# Patient Record
Sex: FEMALE | Race: WHITE | URBAN - METROPOLITAN AREA
[De-identification: names, ages, dates, MRNs, and addresses within clinical notes are randomized per-mention and may not be internally consistent; named-entity substitution may affect disease eponyms.]

---

## 2024-10-07 ENCOUNTER — OFFICE VISIT (OUTPATIENT)
Facility: CLINIC | Age: 29
End: 2024-10-07

## 2024-10-07 VITALS — DIASTOLIC BLOOD PRESSURE: 68 MMHG | HEART RATE: 83 BPM | SYSTOLIC BLOOD PRESSURE: 103 MMHG | WEIGHT: 125 LBS

## 2024-10-07 DIAGNOSIS — N28.9 RENAL INSUFFICIENCY: ICD-10-CM

## 2024-10-07 DIAGNOSIS — N39.0 URINARY TRACT INFECTION WITHOUT HEMATURIA, SITE UNSPECIFIED: Primary | ICD-10-CM

## 2024-10-07 RX ORDER — AMOXICILLIN AND CLAVULANATE POTASSIUM 500; 125 MG/1; MG/1
1 TABLET, FILM COATED ORAL 2 TIMES DAILY
Qty: 30 TABLET | Refills: 2 | Status: SHIPPED | OUTPATIENT
Start: 2024-10-07

## 2024-10-07 RX ORDER — ETONOGESTREL AND ETHINYL ESTRADIOL VAGINAL .015; .12 MG/D; MG/D
1 RING VAGINAL
COMMUNITY
Start: 2024-09-24

## 2024-10-07 RX ORDER — TRETINOIN 1 MG/G
CREAM TOPICAL NIGHTLY
COMMUNITY
Start: 2024-09-19

## 2024-10-07 RX ORDER — CLINDAMYCIN PHOSPHATE 11.9 MG/ML
1 SOLUTION TOPICAL DAILY
COMMUNITY
Start: 2024-09-19

## 2024-10-07 NOTE — PATIENT INSTRUCTIONS
Please take Augmentin as needed x 1    Or if symptoms take it 2x day for 3 total    Please make sure to give urine specimen (standing order enclosed)    Healthy diet, avoid NSAID (advil, aleve, motrin, ibuprofen), avoid IV dye

## 2024-10-07 NOTE — PROGRESS NOTES
Reason for Consult: Abnormal kidney function    HPI:         Here to establish care.    In 2015/2016 had right flank pains recurrently.  Was told that they were stone related or UTI related.    In 2023 she was in [place] and she finally had imaging - found to have R sided hydronephrosis and cortical atrophy/R UPJ obstruction.  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    Saw multiple urologists including Dr Valencia and finally Dr. Zayas at [facility].  He did pyeloplaty in December to maintain GFR by robotic approach    Lasix renal scan done pre op shows L kidey 79% and R kidney 21%.  GFR in Dec 2023 was 78 (Creatinine 1) and repeated in 8/26/23 creatinine 1.1 and GFR 71    Patient also has frequent UTIs  HISTORY:  History reviewed. No pertinent past medical history.   History reviewed. No pertinent surgical history.   Family History   Problem Relation Age of Onset    Diabetes Father     Cancer Maternal Grandmother     Heart Disorder Maternal Grandfather     Cancer Paternal Grandmother     Cancer Paternal Grandfather       Social History:   Social History     Socioeconomic History    Marital status: Single   Tobacco Use    Smoking status: Never    Smokeless tobacco: Never   Substance and Sexual Activity    Alcohol use: Yes     Alcohol/week: 2.0 standard drinks of alcohol     Types: 2 Glasses of wine per week     Comment: occ    Drug use: Never     Social Determinants of Health     Food Insecurity: Low Risk  (3/14/2024)    Received [facility]    Food Insecurity     Have there been times that your food ran out, and you didn't have money to get more?: No     Are there times that you worry that this might happen?: No   Transportation Needs: Low Risk  (3/14/2024)    Received from [facility]    Transportation Needs     Do you have trouble getting transportation to medical appointments?: No     How do you normally get to and from your  appointments?: Public Transit (such as [facility], [facility], [employer])        Medications (Active prior to today's visit):  Current Outpatient Medications   Medication Sig Dispense Refill    clindamycin 1 % External Solution Apply 1 Application topically daily.      ENILLORING 0.12-0.015 MG/24HR Vaginal Ring Place 1 each vaginally every 21 days.      Tretinoin 0.1 % External Cream Apply topically nightly.      amoxicillin clavulanate (AUGMENTIN) 500-125 MG Oral Tab Take 1 tablet by mouth in the morning and 1 tablet before bedtime. Please take 1 tab BID x 3 days as needed for UTI prophylaxis. 30 tablet 2       Allergies:  Not on File      ROS:     Constitutional:  Negative for decreased activity, fever, irritability and lethargy  ENMT:  Negative for ear drainage, hearing loss and nasal drainage  Eyes:  Negative for eye discharge and vision loss  Cardiovascular:  Negative for chest pain, sobs  Respiratory:  Negative for cough, dyspnea and wheezing  Gastrointestinal:  Negative for abdominal pain, constipation  Genitourinary:  Negative for dysuria and hematuria  Endocrine:  Negative for abnormal sleep patterns, increased activity  Hema/Lymph:  Negative for easy bleeding and easy bruising  Integumentary:  Negative for pruritus and rash  Musculoskeletal:  Negative for bone/joint symptoms  Neurological:  Negative for gait disturbance  Psychiatric:  Negative for inappropriate interaction and psychiatric symptoms      Vitals:    10/07/24 1001   BP: 103/68   Pulse: 83       PHYSICAL EXAM:   Constitutional: appears well hydrated alert and responsive no acute distress noted  Head/Face: normocephalic  Eyes/Vision: normal extraocular motion is intact  Nose/Mouth/Throat:mucous membranes are moist   Neck/Thyroid: neck is supple without adenopathy  Lymphatic: no abnormal cervical, supraclavicular adenopathy is noted  Respiratory:  lungs are clear to auscultation bilaterally  Cardiovascular: regular rate and rhythm  Abdomen: soft, non-tender,  non-distended, BS normal  Skin/Hair: no unusual rashes present  Back/Spine: no abnormalities noted  Musculoskeletal:  no deformities  Extremities: no edema  Neurological:  Grossly normal     No results found for: \"GLU\", \"NA\", \"K\", \"CL\", \"CO2\", \"ANIONGAP\", \"BUN\", \"CREATSERUM\", \"CA\", \"OSMOCALC\", \"EGFRCR\", \"ALB\", \"PHOS\"      ASSESSMENT/PLAN:   Assessment   1. Urinary tract infection without hematuria, site unspecified  Some of her UTIs are happening postcoital.  I looked at the sensitivities of Osmel her urinary tract infections 1 from July and one from August.  They are both E. coli.  However the later E. coli infection had some resistance patterns.  It is sensitive to Augmentin.  I have given her prescription for Augmentin to take postcoital and when she experiences urinary tract symptoms.  I did encourage her to give a sample of urine  - Urinalysis with Culture Reflex; Standing  - Urine Culture, Routine; Standing    2. Renal insufficiency  Due to to the blood flow issues that were described above.  We talked about ways to preserve her GFR including avoiding NSAIDs, IV contrast.  We also talked about eating a healthy diet.             Orders This Visit:  Orders Placed This Encounter   Procedures    Urinalysis with Culture Reflex    Urine Culture, Routine       Meds This Visit:  Requested Prescriptions     Signed Prescriptions Disp Refills    amoxicillin clavulanate (AUGMENTIN) 500-125 MG Oral Tab 30 tablet 2     Sig: Take 1 tablet by mouth in the morning and 1 tablet before bedtime. Please take 1 tab BID x 3 days as needed for UTI prophylaxis.       Imaging & Referrals:  None     10/7/2024   CHARLES CASH MD    Return in about 6 months (around 4/7/2025).

## 2024-10-11 ENCOUNTER — TELEPHONE (OUTPATIENT)
Facility: CLINIC | Age: 29
End: 2024-10-11

## 2024-10-11 ENCOUNTER — TELEPHONE (OUTPATIENT)
Dept: NEPHROLOGY | Facility: CLINIC | Age: 29
End: 2024-10-11

## 2024-10-11 DIAGNOSIS — N39.0 URINARY TRACT INFECTION WITHOUT HEMATURIA, SITE UNSPECIFIED: Primary | ICD-10-CM

## 2024-10-11 NOTE — TELEPHONE ENCOUNTER
Information Dr. Burr wants you to know:    Your opinion matters! Thank you for choosing Dr.Colleen Burr, ENT, at [facility].     In the next few weeks you may receive a patient satisfaction survey about your most recent clinic visit with us. Please take some time to evaluate your visit. We strive to provide you with the best medical care and hope you were happy with our services. Your input will help us better meet your health care needs in the future. Again, thank you for your feedback and we look forward to your next visit.     Medication prescribed at today’s visit  Will be sent to your pharmacy electronically. Please allow 1-2 hours for your pharmacy to get the medication ready for you.    Medication Requests:  If you need a refill on your prescription please call your pharmacy and let them know. Please be proactive and call before your medication runs out. The pharmacy will then contact us for the refill. Please allow 24-48 hours for the refill to be processed.     Imaging or Referral orders:   You should be contacted by [facility] within 5-7 business days to schedule these appointments/tests. If you have not been contacted by that time please call [facility] Scheduling department at [phone]. Or my office if you have further questions.    Test results:  If your physician has ordered additional laboratory or radiology testing as part of your ongoing plan of care, please allow 5-7 business days from the day of your lab draw or test completion for the results to be sent and reviewed by your provider. If your results are critical and require more immediate intervention, you will be contacted sooner. Your results will be conveyed to you via a phone call or letter.    [employer] Registration:  If you are not registered for a [employer] account, please ask your  to send you the link via e-mail or you can call [phone] and receive registration information.    Clinic hours for   Patient returning RN's call.  Please call     See 10/11/24 closed telephone encounter      [place]:  Monday 8:30 am - 4:30 pm MARCOS Garcia  Tuesday 7:30 am - 4:30 pm ARUN Steve  Wednesday In Surgery (no clinic hours)   Thursday 8:30 am - 5:30 pm MARCOS Garcia  Friday 8:30 am - 4:30 pm MARCOS Garcia     [address]               [address] [facility]  AR[place] Steve [address]                    MARC[place] Garcia [address]  Phone [phone]              Phone [phone]

## 2024-10-11 NOTE — TELEPHONE ENCOUNTER
Informed pt of note below  and last name verified and verbalized understanding.generated order for referral.

## 2024-10-11 NOTE — TELEPHONE ENCOUNTER
Forwarded message to Dr. Michael Ta called office; patient has allergy to Augmentin    Consult visit: 10/7/24 no allergies on file  Called patient, has allergy to Keflex,cephalexin- high anaphylaxis.  Augmentin is similar drug-updated allergies on file    Instructions given at time of visit: take postcoital and when she experiences urinary tract symptoms. Denies any symptoms and aware the importance of doing UA culture.

## 2024-10-11 NOTE — TELEPHONE ENCOUNTER
If the pt is allergic to Penicillan - she cannot take amoxicillan either.     Please call the pt.  Please let her know that we will have to have her see infectious  disease if she has a PCN allergy - I have no idea what to prescribe her as the second UTI was quite resistant.      We had talked about ID when she came to see me but I said I would like to wait.  I think she should consult now because I don't know what to giver her.     Dr Evelyn Calixto from [facility] is who I recommend.      Please tell her to print off her 2 quest labs with ecoli cultures so that she can show to Dr Calixto

## 2024-10-11 NOTE — TELEPHONE ENCOUNTER
[facility] pharmacy states patient is allergic to augmentin and wondering if it can be changed to amoxicillin please follow up

## 2024-11-11 ENCOUNTER — PATIENT MESSAGE (OUTPATIENT)
Facility: CLINIC | Age: 29
End: 2024-11-11

## 2024-11-11 DIAGNOSIS — N18.9 CHRONIC KIDNEY DISEASE, UNSPECIFIED CKD STAGE: Primary | ICD-10-CM

## 2024-11-12 NOTE — TELEPHONE ENCOUNTER
Spoke to patient and relayed message as shown below. Patient verbalized understanding of whole message and had no further questions at this time.

## 2024-11-12 NOTE — TELEPHONE ENCOUNTER
Please let her know that I am going to order a UA and a urine culture.  Please submit that, and I believe I already gave her prescription for Augmentin.  She should start it today and take 3 days worth

## 2024-11-22 ENCOUNTER — LAB ENCOUNTER (OUTPATIENT)
Dept: LAB | Age: 29
End: 2024-11-22
Attending: INTERNAL MEDICINE
Payer: COMMERCIAL

## 2024-11-22 DIAGNOSIS — N18.9 CHRONIC KIDNEY DISEASE, UNSPECIFIED CKD STAGE: ICD-10-CM

## 2024-11-22 LAB
ALBUMIN SERPL-MCNC: 4.4 G/DL (ref 3.2–4.8)
ANION GAP SERPL CALC-SCNC: 7 MMOL/L (ref 0–18)
BILIRUB UR QL: NEGATIVE
BUN BLD-MCNC: 27 MG/DL (ref 9–23)
BUN/CREAT SERPL: 25 (ref 10–20)
CALCIUM BLD-MCNC: 10 MG/DL (ref 8.7–10.4)
CHLORIDE SERPL-SCNC: 105 MMOL/L (ref 98–112)
CLARITY UR: CLEAR
CO2 SERPL-SCNC: 29 MMOL/L (ref 21–32)
COLOR UR: YELLOW
CREAT BLD-MCNC: 1.08 MG/DL
EGFRCR SERPLBLD CKD-EPI 2021: 72 ML/MIN/1.73M2 (ref 60–?)
GLUCOSE BLD-MCNC: 87 MG/DL (ref 70–99)
GLUCOSE UR-MCNC: NORMAL MG/DL
HGB UR QL STRIP.AUTO: NEGATIVE
KETONES UR-MCNC: NEGATIVE MG/DL
LEUKOCYTE ESTERASE UR QL STRIP.AUTO: NEGATIVE
NITRITE UR QL STRIP.AUTO: NEGATIVE
OSMOLALITY SERPL CALC.SUM OF ELEC: 296 MOSM/KG (ref 275–295)
PH UR: 6.5 [PH] (ref 5–8)
PHOSPHATE SERPL-MCNC: 5 MG/DL (ref 2.4–5.1)
POTASSIUM SERPL-SCNC: 4.1 MMOL/L (ref 3.5–5.1)
PROT UR-MCNC: 30 MG/DL
SODIUM SERPL-SCNC: 141 MMOL/L (ref 136–145)
SP GR UR STRIP: 1.03 (ref 1–1.03)
UROBILINOGEN UR STRIP-ACNC: NORMAL

## 2024-11-22 PROCEDURE — 87086 URINE CULTURE/COLONY COUNT: CPT

## 2024-11-22 PROCEDURE — 87077 CULTURE AEROBIC IDENTIFY: CPT

## 2024-11-22 PROCEDURE — 36415 COLL VENOUS BLD VENIPUNCTURE: CPT

## 2024-11-22 PROCEDURE — 80069 RENAL FUNCTION PANEL: CPT

## 2024-11-22 PROCEDURE — 81001 URINALYSIS AUTO W/SCOPE: CPT

## 2024-11-25 ENCOUNTER — TELEPHONE (OUTPATIENT)
Dept: NEPHROLOGY | Facility: CLINIC | Age: 29
End: 2024-11-25

## 2024-11-25 NOTE — TELEPHONE ENCOUNTER
----- Message from CHARLES JORIESTUARDO sent at 11/24/2024 12:30 PM CST -----  Hi there, please let Kerry know that I got her urine culture back.  She did have a UTI.  However the only antibiotic available to treat this UTI is one that she has an allergy to.  I will not be able to prescribe an antibiotic for this as such.    I had recommended to her in the past to see infectious disease, Dr. Renny Calixto.  Was she able to do that?  She will be able to help us

## 2024-11-25 NOTE — TELEPHONE ENCOUNTER
Called; related test result message. Voiced understanding.  She called Dr. Renny Calixto office- scheduled an appointment in November and canceled it.  She agreed to call and reschedule; aware of importance to be seen right away for treatment of UTI.

## 2025-05-14 ENCOUNTER — LAB ENCOUNTER (OUTPATIENT)
Dept: LAB | Facility: HOSPITAL | Age: 30
End: 2025-05-14
Attending: INTERNAL MEDICINE
Payer: COMMERCIAL

## 2025-05-14 ENCOUNTER — OFFICE VISIT (OUTPATIENT)
Facility: CLINIC | Age: 30
End: 2025-05-14

## 2025-05-14 VITALS — SYSTOLIC BLOOD PRESSURE: 117 MMHG | HEART RATE: 79 BPM | WEIGHT: 129 LBS | DIASTOLIC BLOOD PRESSURE: 74 MMHG

## 2025-05-14 DIAGNOSIS — R82.81 PYURIA: ICD-10-CM

## 2025-05-14 DIAGNOSIS — N18.2 STAGE 2 CHRONIC KIDNEY DISEASE: ICD-10-CM

## 2025-05-14 DIAGNOSIS — N18.2 STAGE 2 CHRONIC KIDNEY DISEASE: Primary | ICD-10-CM

## 2025-05-14 LAB
ALBUMIN SERPL-MCNC: 4.2 G/DL (ref 3.2–4.8)
ANION GAP SERPL CALC-SCNC: 9 MMOL/L (ref 0–18)
BILIRUB UR QL: NEGATIVE
BUN BLD-MCNC: 22 MG/DL (ref 9–23)
BUN/CREAT SERPL: 22.7 (ref 10–20)
CALCIUM BLD-MCNC: 9 MG/DL (ref 8.7–10.4)
CHLORIDE SERPL-SCNC: 106 MMOL/L (ref 98–112)
CLARITY UR: CLEAR
CO2 SERPL-SCNC: 23 MMOL/L (ref 21–32)
CREAT BLD-MCNC: 0.97 MG/DL (ref 0.55–1.02)
CREAT UR-SCNC: 82 MG/DL
EGFRCR SERPLBLD CKD-EPI 2021: 82 ML/MIN/1.73M2 (ref 60–?)
GLUCOSE BLD-MCNC: 89 MG/DL (ref 70–99)
GLUCOSE UR-MCNC: NORMAL MG/DL
HGB UR QL STRIP.AUTO: NEGATIVE
KETONES UR-MCNC: NEGATIVE MG/DL
LEUKOCYTE ESTERASE UR QL STRIP.AUTO: 25
MICROALBUMIN UR-MCNC: 0.3 MG/DL
MICROALBUMIN/CREAT 24H UR-RTO: 3.7 UG/MG (ref ?–30)
NITRITE UR QL STRIP.AUTO: NEGATIVE
OSMOLALITY SERPL CALC.SUM OF ELEC: 289 MOSM/KG (ref 275–295)
PH UR: 7 [PH] (ref 5–8)
PHOSPHATE SERPL-MCNC: 3.6 MG/DL (ref 2.4–5.1)
POTASSIUM SERPL-SCNC: 4.1 MMOL/L (ref 3.5–5.1)
PROT UR-MCNC: NEGATIVE MG/DL
SODIUM SERPL-SCNC: 138 MMOL/L (ref 136–145)
SP GR UR STRIP: 1.02 (ref 1–1.03)
UROBILINOGEN UR STRIP-ACNC: NORMAL
VIT D+METAB SERPL-MCNC: 31.9 NG/ML (ref 30–100)

## 2025-05-14 PROCEDURE — 87086 URINE CULTURE/COLONY COUNT: CPT

## 2025-05-14 PROCEDURE — 82570 ASSAY OF URINE CREATININE: CPT

## 2025-05-14 PROCEDURE — 36415 COLL VENOUS BLD VENIPUNCTURE: CPT

## 2025-05-14 PROCEDURE — 82306 VITAMIN D 25 HYDROXY: CPT

## 2025-05-14 PROCEDURE — 82043 UR ALBUMIN QUANTITATIVE: CPT

## 2025-05-14 PROCEDURE — 81001 URINALYSIS AUTO W/SCOPE: CPT

## 2025-05-14 PROCEDURE — 80069 RENAL FUNCTION PANEL: CPT

## 2025-05-14 NOTE — PROGRESS NOTES
Progress Note     Lee Ann Dey    Here for follow-up.  Went to see Dr Calixto around Iván time.  Was given cipro    Medications (Active prior to today's visit):  Current Outpatient Medications   Medication Sig Dispense Refill    clindamycin 1 % External Solution Apply 1 Application topically daily.      ENILLORING 0.12-0.015 MG/24HR Vaginal Ring Place 1 each vaginally every 21 days.      Tretinoin 0.1 % External Cream Apply topically nightly.      amoxicillin clavulanate (AUGMENTIN) 500-125 MG Oral Tab Take 1 tablet by mouth in the morning and 1 tablet before bedtime. Please take 1 tab BID x 3 days as needed for UTI prophylaxis. 30 tablet 2           ROS:     Constitutional:  Negative for decreased activity, fever, irritability and lethargy  ENMT:  Negative for ear drainage, hearing loss and nasal drainage  Eyes:  Negative for eye discharge and vision loss  Cardiovascular:  Negative for chest pain, sob  Respiratory:  Negative for cough, dyspnea and wheezing  Gastrointestinal:  Negative for abdominal pain, constipation  Genitourinary:  Negative for dysuria and hematuria  Endocrine:  Negative for abnormal sleep patterns  Hema/Lymph:  Negative for easy bleeding and easy bruising  Integumentary:  Negative for pruritus and rash  Musculoskeletal:  Negative for bone/joint symptoms  Neurological:  Negative for gait disturbance  Psychiatric:  Negative for inappropriate interaction and psychiatric symptoms      Vitals:    05/14/25 1040   BP: 117/74   Pulse: 79       PHYSICAL EXAM:   Constitutional: appears well hydrated alert and responsive   Head/Face: normocephalic  Eyes/Vision: normal extraocular motion is intact  Nose/Mouth/Throat:mucous membranes are moist   Neck/Thyroid: neck is supple without adenopathy  Lymphatic: no abnormal cervical, supraclavicular adenopathy is noted  Respiratory:  lungs are clear to auscultation bilaterally  Cardiovascular: regular rate and rhythm   Abdomen: soft, non-tender,  non-distended, BS normal  Skin/Hair: no unusual rashes present, no abnormal bruising noted  Musculoskeletal: no deformities  Extremities: no edema  Neurological:  Grossly normal       Lab Results   Component Value Date    GLU 87 11/22/2024     11/22/2024    K 4.1 11/22/2024     11/22/2024    CO2 29.0 11/22/2024    ANIONGAP 7 11/22/2024    BUN 27 (H) 11/22/2024    CREATSERUM 1.08 (H) 11/22/2024    CA 10.0 11/22/2024    OSMOCALC 296 (H) 11/22/2024    EGFRCR 72 11/22/2024    ALB 4.4 11/22/2024    PHOS 5.0 11/22/2024         ASSESSMENT/PLAN:   Assessment   1. Stage 2 chronic kidney disease  Recheck GFR    2. Pyuria  Pt is having twinges of pain - will check urine for UTI.  Will give cipro if +UTI             Orders This Visit:  Orders Placed This Encounter   Procedures    Vitamin D    Renal Function Panel    Urinalysis, Routine    Microalb/Creat Ratio, Random Urine    Urine Culture, Routine       Meds This Visit:  Requested Prescriptions      No prescriptions requested or ordered in this encounter       Imaging & Referrals:  None     5/14/2025   CHARLES CASH MD    Return in about 1 year (around 5/14/2026).

## 2025-05-16 ENCOUNTER — TELEPHONE (OUTPATIENT)
Dept: NEPHROLOGY | Facility: CLINIC | Age: 30
End: 2025-05-16

## 2025-05-16 NOTE — TELEPHONE ENCOUNTER
----- Message from CHARLES RODRIGUEZ sent at 5/16/2025 12:03 PM CDT -----  Please let Concetta know that her kidney function looks great on these labs!  So far the urine did not look like it had any evidence of infection, but I am waiting on the final urine culture which   will be back on Monday.  Will let her know if there is something that needs to be treated  ----- Message -----  From: Lab, Background User  Sent: 5/14/2025   1:44 PM CDT  To: Charles Rodriguez MD

## 2025-05-19 ENCOUNTER — OFFICE VISIT (OUTPATIENT)
Dept: URGENT CARE | Age: 30
End: 2025-05-19
Payer: COMMERCIAL

## 2025-05-19 ENCOUNTER — TELEPHONE (OUTPATIENT)
Dept: NEPHROLOGY | Facility: CLINIC | Age: 30
End: 2025-05-19

## 2025-05-19 ENCOUNTER — PATIENT MESSAGE (OUTPATIENT)
Facility: CLINIC | Age: 30
End: 2025-05-19

## 2025-05-19 VITALS
BODY MASS INDEX: 19.62 KG/M2 | OXYGEN SATURATION: 97 % | WEIGHT: 125 LBS | SYSTOLIC BLOOD PRESSURE: 123 MMHG | TEMPERATURE: 99.3 F | RESPIRATION RATE: 16 BRPM | HEART RATE: 81 BPM | HEIGHT: 67 IN | DIASTOLIC BLOOD PRESSURE: 76 MMHG

## 2025-05-19 DIAGNOSIS — N39.0 URINARY TRACT INFECTION WITHOUT HEMATURIA, SITE UNSPECIFIED: Primary | ICD-10-CM

## 2025-05-19 LAB
POC APPEARANCE, URINE: ABNORMAL
POC BILIRUBIN, URINE: ABNORMAL
POC BLOOD, URINE: ABNORMAL
POC COLOR, URINE: ABNORMAL
POC GLUCOSE, URINE: ABNORMAL MG/DL
POC KETONES, URINE: ABNORMAL MG/DL
POC LEUKOCYTES, URINE: ABNORMAL
POC NITRITE,URINE: POSITIVE
POC PH, URINE: 8.5 PH
POC PROTEIN, URINE: ABNORMAL MG/DL
POC SPECIFIC GRAVITY, URINE: 1.01
POC UROBILINOGEN, URINE: 2 EU/DL
PREGNANCY TEST URINE, POC: NEGATIVE

## 2025-05-19 RX ORDER — NITROFURANTOIN 25; 75 MG/1; MG/1
100 CAPSULE ORAL 2 TIMES DAILY
Qty: 14 CAPSULE | Refills: 0 | Status: SHIPPED | OUTPATIENT
Start: 2025-05-19 | End: 2025-05-26

## 2025-05-19 ASSESSMENT — PAIN SCALES - GENERAL: PAINLEVEL_OUTOF10: 5

## 2025-05-19 NOTE — TELEPHONE ENCOUNTER
Moshe Noland - if she is traveling will be hard to do culture.  Can you find out her pharmacy since she is traveling and send cipro 500 mg every day x 5 days? Thanks

## 2025-05-19 NOTE — TELEPHONE ENCOUNTER
Patient is calling with complaints of UTI symptoms.  Call dropped before getting any additional information.  Please call

## 2025-05-19 NOTE — PROGRESS NOTES
"Subjective   Patient ID: Betsey Cabrera is a 29 y.o. female. They present today with a chief complaint of UTI sx (X 2 days).    Patient disposition: Home    History of Present Illness  HPI  Urinary symptoms for the past 2 days.  Last UTI about 1 year ago.  Symptoms include pressure, discomfort and frequency.  Took an Azo earlier today with minor improvement of symptoms.  No fever or chills.  No recent cold or flulike symptoms.  No GI symptoms.  No back pain.  History of CKD 2.      Past Medical History  Allergies as of 05/19/2025 - Reviewed 05/19/2025   Allergen Reaction Noted    Amoxicillin-pot clavulanate Anaphylaxis 10/11/2024    Cephalexin Anaphylaxis, Other, and Unknown 09/23/2016       Prescriptions Prior to Admission[1]     Current Medications[2]    Problem List[3]    Surgical History[4]     reports that she has never smoked. She has never used smokeless tobacco.    Review of Systems  As noted in HPI. ROS otherwise negative unless noted.       Objective    Vitals:    05/19/25 1942   BP: 123/76   Pulse: 81   Resp: 16   Temp: 37.4 °C (99.3 °F)   TempSrc: Oral   SpO2: 97%   Weight: 56.7 kg (125 lb)   Height: 1.702 m (5' 7\")     Patient's last menstrual period was 05/12/2025 (approximate).    Physical Exam  Constitutional: vital signs reviewed. Well developed, well nourished. patient alert and patient without distress.   Head and Face: Normal and atraumatic.      Cardiovascular: Heart rate normal, normal S1 and S2, no gallops, no murmurs and no pericardial rub. Rhythm: Normal.  Pulmonary: No respiratory distress. Palpation of chest: Normal. Clear bilateral breath sounds.   Abdomen: Soft nontender; no abdominal mass palpated. No organomegaly.  Negative flank tenderness on percussion  Skin: Normal skin color and pigmentation, normal skin turgor, and no rash.        Procedures    Point of Care Test & Imaging Results from this visit  Results for orders placed or performed in visit on 05/19/25   POCT UA Automated " manually resulted    Collection Time: 05/19/25  7:54 PM   Result Value Ref Range    POC Color, Urine Orange (A) Straw, Yellow, Light-Yellow    POC Appearance, Urine Hazy (A) Clear    POC Glucose, Urine TRACE (A) NEGATIVE mg/dl    POC Bilirubin, Urine LARGE (3+) (A) NEGATIVE    POC Ketones, Urine TRACE (A) NEGATIVE mg/dl    POC Specific Gravity, Urine 1.010 1.005 - 1.035    POC Blood, Urine TRACE-Intact (A) NEGATIVE    POC PH, Urine 8.5 No Reference Range Established PH    POC Protein, Urine 100 (2+) (A) NEGATIVE mg/dl    POC Urobilinogen, Urine 2.0 (A) 0.2, 1.0 EU/DL    Poc Nitrite, Urine POSITIVE (A) NEGATIVE    POC Leukocytes, Urine LARGE (3+) (A) NEGATIVE   POCT pregnancy, urine manually resulted    Collection Time: 05/19/25  7:54 PM   Result Value Ref Range    Preg Test, Ur Negative Negative            Diagnostic study results (if any) were reviewed.  (If applicable) preliminary radiology reading: [none]    Assessment/Plan   Allergies, medications, history, and pertinent labs/EKGs/Imaging reviewed.        Medical Decision Making  See note    Orders and Diagnoses  Diagnoses and all orders for this visit:  Dysuria  -     POCT UA Automated manually resulted  -     POCT pregnancy, urine manually resulted      Medical Admin Record      Follow Up Instructions  No follow-ups on file.    [At time of discharge patient was clinically well-appearing and HDS for outpatient management. The patient and/or family was educated regarding diagnosis, supportive care, OTC and Rx medications. The patient and/or family was given the opportunity to ask questions prior to discharge and all questions answered. They verbalized understanding of my discussion of the plans for treatment, expected course, indications to return to  or seek further evaluation in ED, and the need for timely follow up as directed. ]      Electronically signed by CCPABLO CHITRA X-RAY           [1] (Not in a hospital admission)  [2]   No current outpatient  medications on file.     No current facility-administered medications for this visit.   [3] There is no problem list on file for this patient.  [4] No past surgical history on file.

## 2025-05-20 NOTE — PATIENT INSTRUCTIONS
You have an infection of your urinary tract.  You will be started on antibiotic which will be sent to the pharmacy; please complete the regimen as directed even if your symptoms improve. It is recommended to take an Probiotic while on this medication to reduce symptoms of upset stomach, diarrhea, and in women, yeast infections.   If you started experiencing fevers, back pain, chills, return to the office or follow-up with your PCP sooner.  Increase your hydration with water, or cranberry juice, drinking 6-8 glasses of water per day.  Do not hold your urine, when you get the urge to urinate, void.  Additionally, you can take over-the-counter medication to help with the burning sensation: Pyridium or phenazopyridine (Azo). This may change your urine color orange.     [A urine culture will be sent, if there is a need to change your antibiotic after the results, you will be notified]

## 2025-05-21 LAB — BACTERIA UR CULT: ABNORMAL
